# Patient Record
Sex: FEMALE | Race: WHITE | NOT HISPANIC OR LATINO | Employment: UNEMPLOYED | URBAN - METROPOLITAN AREA
[De-identification: names, ages, dates, MRNs, and addresses within clinical notes are randomized per-mention and may not be internally consistent; named-entity substitution may affect disease eponyms.]

---

## 2019-01-09 ENCOUNTER — HOSPITAL ENCOUNTER (OUTPATIENT)
Dept: CARDIOLOGY | Facility: HOSPITAL | Age: 59
Discharge: HOME | End: 2019-01-09
Attending: INTERNAL MEDICINE | Admitting: INTERNAL MEDICINE

## 2019-09-10 ENCOUNTER — OUTSIDE FACILITY SERVICE (OUTPATIENT)
Dept: CARDIOLOGY | Facility: CLINIC | Age: 59
End: 2019-09-10

## 2019-09-10 PROCEDURE — 99214 OFFICE O/P EST MOD 30 MIN: CPT | Performed by: INTERNAL MEDICINE

## 2019-09-20 RX ORDER — RAMIPRIL 1.25 MG/1
1.25 CAPSULE ORAL 2 TIMES DAILY
Qty: 180 CAPSULE | Refills: 2 | Status: SHIPPED | OUTPATIENT
Start: 2019-09-20 | End: 2020-05-26

## 2019-09-20 RX ORDER — RAMIPRIL 1.25 MG/1
1 CAPSULE ORAL 2 TIMES DAILY
Refills: 3 | COMMUNITY
Start: 2019-07-18 | End: 2019-09-20 | Stop reason: SDUPTHER

## 2020-05-08 ENCOUNTER — OFFICE VISIT (OUTPATIENT)
Dept: CARDIOLOGY | Facility: CLINIC | Age: 60
End: 2020-05-08

## 2020-05-08 DIAGNOSIS — I50.30 DIASTOLIC CHF DUE TO VALVULAR DISEASE (HCC): Primary | ICD-10-CM

## 2020-05-08 DIAGNOSIS — I25.10 CORONARY ARTERY DISEASE INVOLVING NATIVE CORONARY ARTERY OF NATIVE HEART WITHOUT ANGINA PECTORIS: ICD-10-CM

## 2020-05-08 DIAGNOSIS — I35.1 NONRHEUMATIC AORTIC VALVE INSUFFICIENCY: ICD-10-CM

## 2020-05-08 DIAGNOSIS — I10 ESSENTIAL HYPERTENSION: ICD-10-CM

## 2020-05-08 DIAGNOSIS — I38 DIASTOLIC CHF DUE TO VALVULAR DISEASE (HCC): Primary | ICD-10-CM

## 2020-05-08 PROCEDURE — 99442 PR PHYS/QHP TELEPHONE EVALUATION 11-20 MIN: CPT | Performed by: INTERNAL MEDICINE

## 2020-05-08 RX ORDER — B-COMPLEX WITH VITAMIN C
TABLET ORAL
COMMUNITY

## 2020-05-08 RX ORDER — SACCHAROMYCES BOULARDII 250 MG
250 CAPSULE ORAL 2 TIMES DAILY
COMMUNITY

## 2020-05-08 RX ORDER — LOSARTAN POTASSIUM 25 MG/1
25 TABLET ORAL DAILY
COMMUNITY

## 2020-05-08 NOTE — PROGRESS NOTES
You have chosen to receive care through a telephone visit. Do you consent to use a telephone visit for your medical care today? Yes  This visit has been rescheduled as a phone visit to comply with patient safety concerns in accordance with CDC recommendations. Total time of discussion was 12 minutes.          Subjective:     Encounter Date:05/08/2020      Patient ID: Trice Johnson is a 60 y.o. female.    Chief Complaint: Follow-up CAD and edema  History of Present Illness    59-year-old white female patient with known history of morbid obesity hypertension aortic insufficiency dyslipidemia nonobstructive CAD phone visit today    Patient echocardiogram January 2019 EF 55 to 60% bicuspid aortic valve transesophageal echocardiogram February 2019 moderate to severe aortic insufficiency bicuspid aortic valve EF 60%  Stress test January 2019 moderate to large area of ischemia in the inferolateral wall so cardiac cath February 2019 LAD 50% disease FFR not significant  Patient is complaining of increasing lower extremity edema especially feet and ankles  Advise electrolytes BNP follow-up in 4 weeks at [place]  Elevation tight stockings      The following portions of the patient's history were reviewed and updated as appropriate: Allergies current medications past family history past medical history past social history past surgical history problem list and review of systems  Past Medical History:   Diagnosis Date   • Hypertension      Past Surgical History:   Procedure Laterality Date   • CARDIAC CATHETERIZATION       There were no vitals taken for this visit.  Family History   Problem Relation Age of Onset   • Heart disease Mother    • Heart disease Father    • Hypertension Father    • Diabetes Father    • Heart disease Sister    • Hypertension Sister    • Diabetes Sister    • Heart disease Brother    • Hypertension Brother        Current Outpatient Medications:   •  B Complex Vitamins (VITAMIN B COMPLEX) tablet,  Take  by mouth., Disp: , Rfl:   •  Garlic 1000 MG capsule, Take  by mouth., Disp: , Rfl:   •  losartan (COZAAR) 25 MG tablet, Take 25 mg by mouth Daily., Disp: , Rfl:   •  ramipril (ALTACE) 1.25 MG capsule, Take 1 capsule by mouth 2 (Two) Times a Day., Disp: 180 capsule, Rfl: 2  •  saccharomyces boulardii (FLORASTOR) 250 MG capsule, Take 250 mg by mouth 2 (Two) Times a Day., Disp: , Rfl:   Social History     Socioeconomic History   • Marital status:      Spouse name: Not on file   • Number of children: Not on file   • Years of education: Not on file   • Highest education level: Not on file   Tobacco Use   • Smoking status: Never Smoker   • Smokeless tobacco: Never Used     Allergies   Allergen Reactions   • Dextrans Shortness Of Breath   • Shellfish-Derived Products Anaphylaxis   • Codeine Nausea And Vomiting     Review of Systems   Constitution: Negative for malaise/fatigue.   Cardiovascular: Positive for leg swelling (past 2 weeks increasing). Negative for chest pain, palpitations and syncope.   Respiratory: Negative for shortness of breath.    Skin: Negative for rash.   Gastrointestinal: Negative for nausea and vomiting.   Neurological: Negative for dizziness, light-headedness and numbness.              Objective:     Physical Exam  Alert oriented not in any acute distress  Procedures    Lab Review:       Assessment:          Diagnosis Plan   1. Diastolic CHF due to valvular disease (CMS/HCC)  Basic Metabolic Panel    BNP   2. Nonrheumatic aortic valve insufficiency  Basic Metabolic Panel    BNP   3. Coronary artery disease involving native coronary artery of native heart without angina pectoris  Basic Metabolic Panel    BNP   4. Essential hypertension  Basic Metabolic Panel    BNP          Plan:     Possible acute diastolic congestive heart failure check the electrolytes BNP  Follow-up in 4 weeks probably may need to start the diuresis  Needs to continue aggressive control of hypertension  CAD stable  modify cardiac risk factors

## 2020-05-26 RX ORDER — RAMIPRIL 1.25 MG/1
CAPSULE ORAL
Qty: 180 CAPSULE | Refills: 3 | Status: SHIPPED | OUTPATIENT
Start: 2020-05-26

## 2020-06-08 ENCOUNTER — OFFICE VISIT (OUTPATIENT)
Dept: CARDIOLOGY | Facility: CLINIC | Age: 60
End: 2020-06-08

## 2020-06-08 VITALS
WEIGHT: 293 LBS | HEART RATE: 71 BPM | BODY MASS INDEX: 51.91 KG/M2 | DIASTOLIC BLOOD PRESSURE: 71 MMHG | SYSTOLIC BLOOD PRESSURE: 137 MMHG | OXYGEN SATURATION: 97 % | HEIGHT: 63 IN

## 2020-06-08 DIAGNOSIS — I50.33 ACUTE ON CHRONIC DIASTOLIC CONGESTIVE HEART FAILURE (HCC): Primary | ICD-10-CM

## 2020-06-08 DIAGNOSIS — I10 ESSENTIAL HYPERTENSION: ICD-10-CM

## 2020-06-08 DIAGNOSIS — I35.1 NONRHEUMATIC AORTIC VALVE INSUFFICIENCY: ICD-10-CM

## 2020-06-08 PROCEDURE — 99213 OFFICE O/P EST LOW 20 MIN: CPT | Performed by: INTERNAL MEDICINE

## 2020-06-08 NOTE — PROGRESS NOTES
"    Subjective:     Encounter Date:06/08/2020      Patient ID: Trice Johnson is a 60 y.o. female.    Chief Complaint: Edema  History of Present Illness       60-year-old white female patient with known history of morbid obesity hypertension aortic insufficiency dyslipidemia nonobstructive CAD comes back for follow-up     Patient echocardiogram January 2019 EF 55 to 60% bicuspid aortic valve transesophageal echocardiogram February 2019 moderate to severe aortic insufficiency bicuspid aortic valve EF 60%  Stress test January 2019 moderate to large area of ischemia in the inferolateral wall so cardiac cath February 2019 LAD 50% disease FFR not significant  Patient is complaining of increasing lower extremity edema especially feet and ankles  Recent electrolytes BNP within normal limit  Advised tight stockings elevation  Will try low-dose diuretics and follow-up in 6 months with echocardiogram        The following portions of the patient's history were reviewed and updated as appropriate: Allergies current medications past family history past medical history past social history past surgical history problem list and review of systems  The following portions of the patient's history were reviewed and updated as appropriate: Allergies current medications past family history past medical history past social history past surgical history problem list and review of systems  Past Medical History:   Diagnosis Date   • Cancer (CMS/HCC)     skin   • Hypertension      Past Surgical History:   Procedure Laterality Date   • CARDIAC CATHETERIZATION     • SKIN BIOPSY       /71   Pulse 71   Ht 160 cm (63\")   Wt (!) 144 kg (318 lb)   SpO2 97%   BMI 56.33 kg/m²   Family History   Problem Relation Age of Onset   • Heart disease Mother    • Heart disease Father    • Hypertension Father    • Diabetes Father    • Heart disease Sister    • Hypertension Sister    • Diabetes Sister    • Heart disease Brother    • Hypertension " Brother        Current Outpatient Medications:   •  B Complex Vitamins (VITAMIN B COMPLEX) tablet, Take  by mouth., Disp: , Rfl:   •  Garlic 1000 MG capsule, Take  by mouth., Disp: , Rfl:   •  losartan (COZAAR) 25 MG tablet, Take 25 mg by mouth Daily., Disp: , Rfl:   •  ramipril (ALTACE) 1.25 MG capsule, TAKE 1 CAPSULE TWICE A DAY, Disp: 180 capsule, Rfl: 3  •  saccharomyces boulardii (FLORASTOR) 250 MG capsule, Take 250 mg by mouth 2 (Two) Times a Day., Disp: , Rfl:   •  furosemide (LASIX) 20 MG tablet, Take 1 tablet by mouth Daily., Disp: 90 tablet, Rfl: 3  •  potassium chloride (K-DUR) 10 MEQ CR tablet, Take 1 tablet by mouth Daily., Disp: 90 tablet, Rfl: 3  Social History     Socioeconomic History   • Marital status:      Spouse name: Not on file   • Number of children: Not on file   • Years of education: Not on file   • Highest education level: Not on file   Tobacco Use   • Smoking status: Never Smoker   • Smokeless tobacco: Never Used     Allergies   Allergen Reactions   • Dextrans Shortness Of Breath   • Shellfish-Derived Products Anaphylaxis   • Codeine Nausea And Vomiting     Review of Systems   Constitution: Negative for fever and malaise/fatigue.   HENT: Negative for congestion and hearing loss.    Eyes: Negative for double vision and visual disturbance.   Cardiovascular: Positive for leg swelling. Negative for chest pain, claudication, dyspnea on exertion and syncope.   Respiratory: Negative for cough and shortness of breath.    Endocrine: Negative for cold intolerance.   Skin: Negative for color change and rash.   Musculoskeletal: Negative for arthritis and joint pain.   Gastrointestinal: Negative for abdominal pain and heartburn.   Genitourinary: Negative for hematuria.   Neurological: Negative for excessive daytime sleepiness and dizziness.   Psychiatric/Behavioral: Negative for depression. The patient is not nervous/anxious.    All other systems reviewed and are negative.              Objective:     Physical Exam   Constitutional: She is oriented to person, place, and time. She appears well-developed and well-nourished. She is cooperative.   HENT:   Head: Normocephalic and atraumatic.   Mouth/Throat: Uvula is midline and oropharynx is clear and moist. No oral lesions.   Eyes: Conjunctivae are normal. No scleral icterus.   Neck: Trachea normal. Neck supple. No JVD present. Carotid bruit is not present. No thyromegaly present.   Cardiovascular: Normal rate, regular rhythm, S1 normal, S2 normal, normal heart sounds, intact distal pulses and normal pulses. PMI is not displaced. Exam reveals no gallop and no friction rub.   No murmur heard.  Pulmonary/Chest: Effort normal and breath sounds normal.   Abdominal: Soft. Bowel sounds are normal.   Musculoskeletal: Normal range of motion. She exhibits edema.   Neurological: She is alert and oriented to person, place, and time. She has normal strength.   No focal deficits   Skin: Skin is warm. No cyanosis.   Psychiatric: She has a normal mood and affect.       Procedures    Lab Review:       Assessment:          Diagnosis Plan   1. Acute on chronic diastolic congestive heart failure (CMS/HCC)  Adult Transthoracic Echo Complete W/ Cont if Necessary Per Protocol   2. Essential hypertension  Adult Transthoracic Echo Complete W/ Cont if Necessary Per Protocol   3. Nonrheumatic aortic valve insufficiency  Adult Transthoracic Echo Complete W/ Cont if Necessary Per Protocol          Plan:         Edema with possible diastolic congestive heart failure but electrolytes BNP within normal limit  Advised elevation tight stockings and low-dose diuretics  Monitor blood pressure aggressive control follow-up in 6 months with echo

## 2020-06-09 ENCOUNTER — TELEPHONE (OUTPATIENT)
Dept: CARDIOLOGY | Facility: CLINIC | Age: 60
End: 2020-06-09

## 2020-06-09 NOTE — TELEPHONE ENCOUNTER
Pt called LM asking about the meds.   I explained that Dr Bishop is not in the office and they will be sent as soon as Dr Bishop finishes her office visit note, seen yesterday.       Pt would like meds sent to [facility] in [place], wants phone call when they are sent in please.      Pt also wants Dr Bishop to look at her recent labs scanned into chart

## 2020-06-09 NOTE — TELEPHONE ENCOUNTER
Pt called stating that a water pill and potassium were supposed to be sent in yesterday at her office visit. Office note is not signed.

## 2020-06-10 RX ORDER — FUROSEMIDE 20 MG/1
20 TABLET ORAL DAILY
Qty: 90 TABLET | Refills: 3 | Status: SHIPPED | OUTPATIENT
Start: 2020-06-10 | End: 2020-08-06 | Stop reason: SDUPTHER

## 2020-06-10 RX ORDER — POTASSIUM CHLORIDE 750 MG/1
10 TABLET, FILM COATED, EXTENDED RELEASE ORAL DAILY
Qty: 90 TABLET | Refills: 3 | Status: SHIPPED | OUTPATIENT
Start: 2020-06-10 | End: 2020-08-06 | Stop reason: SDUPTHER

## 2020-06-13 PROBLEM — I35.1 NONRHEUMATIC AORTIC VALVE INSUFFICIENCY: Status: ACTIVE | Noted: 2020-06-13

## 2020-06-13 PROBLEM — I10 ESSENTIAL HYPERTENSION: Status: ACTIVE | Noted: 2020-06-13

## 2020-06-13 PROBLEM — I50.33 ACUTE ON CHRONIC DIASTOLIC CONGESTIVE HEART FAILURE (HCC): Status: ACTIVE | Noted: 2020-06-13

## 2020-07-13 ENCOUNTER — APPOINTMENT (OUTPATIENT)
Dept: CARDIOLOGY | Facility: HOSPITAL | Age: 60
End: 2020-07-13

## 2020-07-17 ENCOUNTER — HOSPITAL ENCOUNTER (OUTPATIENT)
Dept: CARDIOLOGY | Facility: HOSPITAL | Age: 60
Discharge: HOME | End: 2020-07-17
Admitting: INTERNAL MEDICINE

## 2020-07-17 VITALS
BODY MASS INDEX: 51.91 KG/M2 | HEART RATE: 64 BPM | SYSTOLIC BLOOD PRESSURE: 160 MMHG | WEIGHT: 293 LBS | DIASTOLIC BLOOD PRESSURE: 97 MMHG | HEIGHT: 63 IN

## 2020-07-17 DIAGNOSIS — I50.33 ACUTE ON CHRONIC DIASTOLIC CONGESTIVE HEART FAILURE (HCC): ICD-10-CM

## 2020-07-17 DIAGNOSIS — I10 ESSENTIAL HYPERTENSION: ICD-10-CM

## 2020-07-17 DIAGNOSIS — I35.1 NONRHEUMATIC AORTIC VALVE INSUFFICIENCY: ICD-10-CM

## 2020-07-17 PROCEDURE — 93306 TTE W/DOPPLER COMPLETE: CPT

## 2020-07-20 LAB
ASCENDING AORTA: 4.2 CM
BH CV ECHO MEAS - AI DEC SLOPE: 370.9 CM/SEC^2
BH CV ECHO MEAS - AI DEC TIME: 1 SEC
BH CV ECHO MEAS - AI MAX PG: 57.9 MMHG
BH CV ECHO MEAS - AI MAX VEL: 380.3 CM/SEC
BH CV ECHO MEAS - AI P1/2T: 300.3 MSEC
BH CV ECHO MEAS - AO MAX PG (FULL): 41.9 MMHG
BH CV ECHO MEAS - AO MAX PG: 45.3 MMHG
BH CV ECHO MEAS - AO MEAN PG (FULL): 27.2 MMHG
BH CV ECHO MEAS - AO MEAN PG: 29.4 MMHG
BH CV ECHO MEAS - AO ROOT AREA (BSA CORRECTED): 1.5
BH CV ECHO MEAS - AO ROOT AREA: 9.6 CM^2
BH CV ECHO MEAS - AO ROOT DIAM: 3.5 CM
BH CV ECHO MEAS - AO V2 MAX: 336.5 CM/SEC
BH CV ECHO MEAS - AO V2 MEAN: 263.3 CM/SEC
BH CV ECHO MEAS - AO V2 VTI: 90.1 CM
BH CV ECHO MEAS - ASC AORTA: 4.2 CM
BH CV ECHO MEAS - AVA(I,A): 1 CM^2
BH CV ECHO MEAS - AVA(I,D): 1 CM^2
BH CV ECHO MEAS - AVA(V,A): 1 CM^2
BH CV ECHO MEAS - AVA(V,D): 1 CM^2
BH CV ECHO MEAS - BSA(HAYCOCK): 2.6 M^2
BH CV ECHO MEAS - BSA: 2.4 M^2
BH CV ECHO MEAS - BZI_BMI: 56.3 KILOGRAMS/M^2
BH CV ECHO MEAS - BZI_METRIC_HEIGHT: 160 CM
BH CV ECHO MEAS - BZI_METRIC_WEIGHT: 144.2 KG
BH CV ECHO MEAS - EDV(CUBED): 114.1 ML
BH CV ECHO MEAS - EDV(MOD-SP2): 107.4 ML
BH CV ECHO MEAS - EDV(MOD-SP4): 176.4 ML
BH CV ECHO MEAS - EDV(TEICH): 110.2 ML
BH CV ECHO MEAS - EF(CUBED): 37 %
BH CV ECHO MEAS - EF(MOD-BP): 48 %
BH CV ECHO MEAS - EF(MOD-SP2): 45.2 %
BH CV ECHO MEAS - EF(MOD-SP4): 47.3 %
BH CV ECHO MEAS - EF(TEICH): 30.3 %
BH CV ECHO MEAS - ESV(CUBED): 71.9 ML
BH CV ECHO MEAS - ESV(MOD-SP2): 58.8 ML
BH CV ECHO MEAS - ESV(MOD-SP4): 92.9 ML
BH CV ECHO MEAS - ESV(TEICH): 76.8 ML
BH CV ECHO MEAS - FS: 14.3 %
BH CV ECHO MEAS - IVS/LVPW: 1.1
BH CV ECHO MEAS - IVSD: 1.3 CM
BH CV ECHO MEAS - LA DIMENSION(2D): 4 CM
BH CV ECHO MEAS - LA DIMENSION: 3.5 CM
BH CV ECHO MEAS - LA/AO: 1
BH CV ECHO MEAS - LAT PEAK E' VEL: 5 CM/SEC
BH CV ECHO MEAS - LV DIASTOLIC VOL/BSA (35-75): 74.9 ML/M^2
BH CV ECHO MEAS - LV IVRT: 0.05 SEC
BH CV ECHO MEAS - LV MASS(C)D: 234.8 GRAMS
BH CV ECHO MEAS - LV MASS(C)DI: 99.7 GRAMS/M^2
BH CV ECHO MEAS - LV MAX PG: 3.4 MMHG
BH CV ECHO MEAS - LV MEAN PG: 2.2 MMHG
BH CV ECHO MEAS - LV SYSTOLIC VOL/BSA (12-30): 39.4 ML/M^2
BH CV ECHO MEAS - LV V1 MAX: 92.8 CM/SEC
BH CV ECHO MEAS - LV V1 MEAN: 71.3 CM/SEC
BH CV ECHO MEAS - LV V1 VTI: 25.1 CM
BH CV ECHO MEAS - LVIDD: 4.9 CM
BH CV ECHO MEAS - LVIDS: 4.2 CM
BH CV ECHO MEAS - LVOT AREA: 3.7 CM^2
BH CV ECHO MEAS - LVOT DIAM: 2.2 CM
BH CV ECHO MEAS - LVPWD: 1.2 CM
BH CV ECHO MEAS - MED PEAK E' VEL: 4 CM/SEC
BH CV ECHO MEAS - MV A MAX VEL: 125.1 CM/SEC
BH CV ECHO MEAS - MV DEC SLOPE: 577.8 CM/SEC^2
BH CV ECHO MEAS - MV DEC TIME: 0.16 SEC
BH CV ECHO MEAS - MV E MAX VEL: 93.8 CM/SEC
BH CV ECHO MEAS - MV E/A: 0.75
BH CV ECHO MEAS - MV MAX PG: 6.7 MMHG
BH CV ECHO MEAS - MV MEAN PG: 2.3 MMHG
BH CV ECHO MEAS - MV P1/2T: 59 MSEC
BH CV ECHO MEAS - MV V2 MAX: 129.8 CM/SEC
BH CV ECHO MEAS - MV V2 MEAN: 69.5 CM/SEC
BH CV ECHO MEAS - MV V2 VTI: 29 CM
BH CV ECHO MEAS - MVA(P1/2T): 3.7 CM2
BH CV ECHO MEAS - MVA(VTI): 3.2 CM^2
BH CV ECHO MEAS - PA ACC SLOPE: 677 CM/SEC2
BH CV ECHO MEAS - PA ACC TIME: 0.08 SEC
BH CV ECHO MEAS - PA MAX PG (FULL): 0.57 MMHG
BH CV ECHO MEAS - PA MAX PG: 2.1 MMHG
BH CV ECHO MEAS - PA MEAN PG (FULL): 0.47 MMHG
BH CV ECHO MEAS - PA MEAN PG: 1.1 MMHG
BH CV ECHO MEAS - PA PR(ACCEL): 40.9 MMHG
BH CV ECHO MEAS - PA V2 MAX: 73 CM/SEC
BH CV ECHO MEAS - PA V2 MEAN: 51.3 CM/SEC
BH CV ECHO MEAS - PA V2 VTI: 16.9 CM
BH CV ECHO MEAS - PULM A REVS DUR: 0.09 SEC
BH CV ECHO MEAS - PULM A REVS VEL: 32.6 CM/SEC
BH CV ECHO MEAS - PULM DIAS VEL: 44.3 CM/SEC
BH CV ECHO MEAS - PULM S/D: 1.5
BH CV ECHO MEAS - PULM SYS VEL: 67.4 CM/SEC
BH CV ECHO MEAS - RAP SYSTOLE: 3 MMHG
BH CV ECHO MEAS - RV MAX PG: 1.6 MMHG
BH CV ECHO MEAS - RV MEAN PG: 0.68 MMHG
BH CV ECHO MEAS - RV V1 MAX: 62.4 CM/SEC
BH CV ECHO MEAS - RV V1 MEAN: 38.6 CM/SEC
BH CV ECHO MEAS - RV V1 VTI: 11.7 CM
BH CV ECHO MEAS - RVSP: 23.5 MMHG
BH CV ECHO MEAS - SI(AO): 367.9 ML/M^2
BH CV ECHO MEAS - SI(CUBED): 17.9 ML/M^2
BH CV ECHO MEAS - SI(LVOT): 39.5 ML/M^2
BH CV ECHO MEAS - SI(MOD-SP2): 20.6 ML/M^2
BH CV ECHO MEAS - SI(MOD-SP4): 35.4 ML/M^2
BH CV ECHO MEAS - SI(TEICH): 14.2 ML/M^2
BH CV ECHO MEAS - SUP REN AO DIAM: 2.2 CM
BH CV ECHO MEAS - SV(AO): 866.5 ML
BH CV ECHO MEAS - SV(CUBED): 42.2 ML
BH CV ECHO MEAS - SV(LVOT): 93.1 ML
BH CV ECHO MEAS - SV(MOD-SP2): 48.6 ML
BH CV ECHO MEAS - SV(MOD-SP4): 83.5 ML
BH CV ECHO MEAS - SV(TEICH): 33.4 ML
BH CV ECHO MEAS - TAPSE (>1.6): 2.9 CM2
BH CV ECHO MEAS - TR MAX PG: 21 MMHG
BH CV ECHO MEAS - TR MAX VEL: 226.6 CM/SEC
BH CV ECHO MEASUREMENTS AVERAGE E/E' RATIO: 20.84
BH CV XLRA - RV BASE: 4.1 CM
BH CV XLRA - RV MID: 2.4 CM
BH CV XLRA - TDI S': 12 CM/SEC
IVRT: 53 MSEC
LEFT ATRIUM VOLUME INDEX: 33.5 ML/M2
LEFT ATRIUM VOLUME: 79 CM3
LV EF 2D ECHO EST: 50 %

## 2020-07-20 PROCEDURE — 93306 TTE W/DOPPLER COMPLETE: CPT | Performed by: INTERNAL MEDICINE

## 2020-08-06 ENCOUNTER — TELEPHONE (OUTPATIENT)
Dept: CARDIOLOGY | Facility: CLINIC | Age: 60
End: 2020-08-06

## 2020-08-06 RX ORDER — POTASSIUM CHLORIDE 750 MG/1
10 TABLET, FILM COATED, EXTENDED RELEASE ORAL DAILY
Qty: 90 TABLET | Refills: 3 | Status: SHIPPED | OUTPATIENT
Start: 2020-08-06

## 2020-08-06 RX ORDER — FUROSEMIDE 20 MG/1
20 TABLET ORAL DAILY
Qty: 90 TABLET | Refills: 3 | Status: SHIPPED | OUTPATIENT
Start: 2020-08-06

## 2020-08-06 NOTE — TELEPHONE ENCOUNTER
Pt called for echo results and refills on furosemide and potassium.  Refilled meds.  Pt also stated that she started using reg salt while out of town and the swelling in her legs have gone down a lot.  She used to use iodinated salt and still had swelling.  She has a shellfish allergy so she feels like the reg salt is better for her.  She stated she has lost quite a bit of water weight.  She didn't know if adjustments should be made with furosemide.  Please advsd echo results as well.  Pt can be reached at [phone].

## 2020-08-06 NOTE — TELEPHONE ENCOUNTER
Called and left a message regarding echocardiogram  Please advise patient decrease Lasix or take it as needed for swelling

## 2020-08-07 NOTE — TELEPHONE ENCOUNTER
Called pt and made aware of Lasix change.  She verbally understood and stated she would call back in to schedule a follow up.

## 2021-10-21 ENCOUNTER — TELEPHONE (OUTPATIENT)
Dept: CARDIOLOGY | Facility: CLINIC | Age: 61
End: 2021-10-21

## 2021-10-21 NOTE — TELEPHONE ENCOUNTER
Called and spoke w pt and advsd that we needed to get appt scheduled for f/u to get meds refilled  She stated that she was going to get her PCP to fill it for her.